# Patient Record
Sex: FEMALE | URBAN - METROPOLITAN AREA
[De-identification: names, ages, dates, MRNs, and addresses within clinical notes are randomized per-mention and may not be internally consistent; named-entity substitution may affect disease eponyms.]

---

## 2023-07-12 ENCOUNTER — OFFICE VISIT (OUTPATIENT)
Facility: LOCATION | Age: 53
End: 2023-07-12
Payer: COMMERCIAL

## 2023-07-12 DIAGNOSIS — H02.34 BLEPHAROCHALASIS OF LEFT UPPER LID: ICD-10-CM

## 2023-07-12 DIAGNOSIS — H10.523 ANGULAR BLEPHAROCONJUNCTIVITIS, BILATERAL: ICD-10-CM

## 2023-07-12 DIAGNOSIS — H02.31 BLEPHAROCHALASIS OF RIGHT UPPER LID: Primary | ICD-10-CM

## 2023-07-12 PROCEDURE — 92004 COMPRE OPH EXAM NEW PT 1/>: CPT | Performed by: OPHTHALMOLOGY

## 2023-07-12 RX ORDER — TOBRAMYCIN AND DEXAMETHASONE 3; 1 MG/G; MG/G
OINTMENT OPHTHALMIC
Qty: 5 | Refills: 1 | Status: ACTIVE
Start: 2023-07-12

## 2023-07-12 ASSESSMENT — KERATOMETRY
OD: 43.63
OS: 44.00

## 2023-07-12 ASSESSMENT — INTRAOCULAR PRESSURE
OD: 12
OS: 12

## 2023-07-12 NOTE — IMPRESSION/PLAN
Impression: Angular blepharoconjunctivitis, bilateral: H10.523. Plan: Chronic blepharitis accounts for the patient's complaints. Start new medication(s) and eyelid scrubs with baby shampoo. The patient understands this may not cure the condition and that there may be a need to stay on the medication regimen. In addition, the patient was informed that the condition may take a few weeks to improve. 

Start Tobradex ROSHNI BID OU